# Patient Record
Sex: MALE | Race: WHITE | NOT HISPANIC OR LATINO
[De-identification: names, ages, dates, MRNs, and addresses within clinical notes are randomized per-mention and may not be internally consistent; named-entity substitution may affect disease eponyms.]

---

## 2017-10-20 PROBLEM — Z00.00 ENCOUNTER FOR PREVENTIVE HEALTH EXAMINATION: Status: ACTIVE | Noted: 2017-10-20

## 2017-11-01 ENCOUNTER — APPOINTMENT (OUTPATIENT)
Dept: INTERNAL MEDICINE | Facility: CLINIC | Age: 62
End: 2017-11-01
Payer: MEDICARE

## 2017-11-01 VITALS
OXYGEN SATURATION: 97 % | SYSTOLIC BLOOD PRESSURE: 120 MMHG | BODY MASS INDEX: 24.27 KG/M2 | DIASTOLIC BLOOD PRESSURE: 80 MMHG | RESPIRATION RATE: 16 BRPM | HEIGHT: 66 IN | WEIGHT: 151 LBS | TEMPERATURE: 98 F | HEART RATE: 79 BPM

## 2017-11-01 DIAGNOSIS — R05 COUGH: ICD-10-CM

## 2017-11-01 DIAGNOSIS — I65.22 OCCLUSION AND STENOSIS OF LEFT CAROTID ARTERY: ICD-10-CM

## 2017-11-01 DIAGNOSIS — Z82.49 FAMILY HISTORY OF ISCHEMIC HEART DISEASE AND OTHER DISEASES OF THE CIRCULATORY SYSTEM: ICD-10-CM

## 2017-11-01 DIAGNOSIS — Z77.090 CONTACT WITH AND (SUSPECTED) EXPOSURE TO ASBESTOS: ICD-10-CM

## 2017-11-01 DIAGNOSIS — H66.90 OTITIS MEDIA, UNSPECIFIED, UNSPECIFIED EAR: ICD-10-CM

## 2017-11-01 DIAGNOSIS — N40.0 BENIGN PROSTATIC HYPERPLASIA WITHOUT LOWER URINARY TRACT SYMPMS: ICD-10-CM

## 2017-11-01 DIAGNOSIS — R31.9 HEMATURIA, UNSPECIFIED: ICD-10-CM

## 2017-11-01 DIAGNOSIS — Z88.9 ALLERGY STATUS TO UNSPECIFIED DRUGS, MEDICAMENTS AND BIOLOGICAL SUBSTANCES: ICD-10-CM

## 2017-11-01 DIAGNOSIS — J30.9 ALLERGIC RHINITIS, UNSPECIFIED: ICD-10-CM

## 2017-11-01 DIAGNOSIS — D51.0 VITAMIN B12 DEFICIENCY ANEMIA DUE TO INTRINSIC FACTOR DEFICIENCY: ICD-10-CM

## 2017-11-01 DIAGNOSIS — R09.82 POSTNASAL DRIP: ICD-10-CM

## 2017-11-01 DIAGNOSIS — Z77.120 CONTACT WITH AND (SUSPECTED) EXPOSURE TO MOLD (TOXIC): ICD-10-CM

## 2017-11-01 DIAGNOSIS — J45.909 UNSPECIFIED ASTHMA, UNCOMPLICATED: ICD-10-CM

## 2017-11-01 DIAGNOSIS — Z78.9 OTHER SPECIFIED HEALTH STATUS: ICD-10-CM

## 2017-11-01 DIAGNOSIS — J04.10 ACUTE TRACHEITIS W/OUT OBSTRUCTION: ICD-10-CM

## 2017-11-01 DIAGNOSIS — R53.82 CHRONIC FATIGUE, UNSPECIFIED: ICD-10-CM

## 2017-11-01 DIAGNOSIS — R91.8 OTHER NONSPECIFIC ABNORMAL FINDING OF LUNG FIELD: ICD-10-CM

## 2017-11-01 DIAGNOSIS — L21.9 SEBORRHEIC DERMATITIS, UNSPECIFIED: ICD-10-CM

## 2017-11-01 PROCEDURE — 94060 EVALUATION OF WHEEZING: CPT

## 2017-11-01 PROCEDURE — 94729 DIFFUSING CAPACITY: CPT

## 2017-11-01 PROCEDURE — 94727 GAS DIL/WSHOT DETER LNG VOL: CPT

## 2017-11-01 PROCEDURE — 99205 OFFICE O/P NEW HI 60 MIN: CPT | Mod: 25

## 2017-11-01 RX ORDER — UBIQUINOL 100 MG
100 CAPSULE ORAL
Refills: 0 | Status: ACTIVE | COMMUNITY

## 2017-11-01 RX ORDER — KRILL/OM-3/DHA/EPA/PHOSPHO/AST 350MG-90MG
CAPSULE ORAL
Refills: 0 | Status: ACTIVE | COMMUNITY

## 2017-11-01 RX ORDER — MULTIVIT-MIN/FA/LYCOPEN/LUTEIN .4-300-25
TABLET ORAL
Refills: 0 | Status: ACTIVE | COMMUNITY

## 2017-11-01 RX ORDER — CYANOCOBALAMIN 1000 UG/ML
1000 INJECTION INTRAMUSCULAR; SUBCUTANEOUS
Refills: 0 | Status: ACTIVE | COMMUNITY

## 2017-11-01 RX ORDER — MULTIVIT-MIN/FOLIC/VIT K/LYCOP 400-300MCG
50 MCG TABLET ORAL
Refills: 0 | Status: ACTIVE | COMMUNITY

## 2017-11-01 RX ORDER — HYDROCORTISONE VALERATE 2 MG/G
0.2 CREAM TOPICAL
Refills: 0 | Status: ACTIVE | COMMUNITY

## 2017-11-01 RX ORDER — OMEGA-3/DHA/EPA/FISH OIL 300-1000MG
CAPSULE ORAL
Refills: 0 | Status: ACTIVE | COMMUNITY

## 2017-11-08 ENCOUNTER — OTHER (OUTPATIENT)
Age: 62
End: 2017-11-08

## 2017-11-17 DIAGNOSIS — E83.39 OTHER DISORDERS OF PHOSPHORUS METABOLISM: ICD-10-CM

## 2017-12-11 ENCOUNTER — APPOINTMENT (OUTPATIENT)
Dept: INTERNAL MEDICINE | Facility: CLINIC | Age: 62
End: 2017-12-11

## 2020-07-28 ENCOUNTER — APPOINTMENT (OUTPATIENT)
Dept: DERMATOLOGY | Facility: CLINIC | Age: 65
End: 2020-07-28
Payer: COMMERCIAL

## 2020-07-28 DIAGNOSIS — Z84.0 FAMILY HISTORY OF DISEASES OF THE SKIN AND SUBCUTANEOUS TISSUE: ICD-10-CM

## 2020-07-28 DIAGNOSIS — D22.5 MELANOCYTIC NEVI OF TRUNK: ICD-10-CM

## 2020-07-28 DIAGNOSIS — Z91.89 OTHER SPECIFIED PERSONAL RISK FACTORS, NOT ELSEWHERE CLASSIFIED: ICD-10-CM

## 2020-07-28 PROCEDURE — 99213 OFFICE O/P EST LOW 20 MIN: CPT

## 2020-07-28 NOTE — PHYSICAL EXAM
[Alert] : alert [Well Nourished] : well nourished [Oriented x 3] : ~L oriented x 3 [FreeTextEntry3] : The following areas were examined and no significant abnormalities were seen except as noted below:\par \par Type II skin\par \par scalp, face, eyelids, nose, lips, ears, neck, chest, abdomen, back,groin, buttocks, right arm, left arm, right hand, left hand,\par right  leg, left leg, right foot, left foot\par \par Scalp: Moderate thin pink scaly macules and small patches\par Face: Moderate ill-defined erythematous patches with a few papules, especially cheeks and around the mouth\par Back: Mild 3-5 mm tan papules\par \par No other suspicious lesions he

## 2020-07-28 NOTE — ASSESSMENT
[FreeTextEntry1] : Actinic keratoses on scalp\par Rosacea like dermatitis on face this?  Secondary to chronic usage of the hydrocortisone valerate cream 0.2%

## 2020-07-28 NOTE — HISTORY OF PRESENT ILLNESS
[FreeTextEntry1] : Evaluation of growths [de-identified] : Followup visit for 65-year-old white male presenting for evaluation of growths.  No history of skin cancer.\par Patient has history of seborrheic dermatitis on the scalp and face treated with hydrocortisone valerate cream 0.2% with good results.

## 2020-10-01 ENCOUNTER — APPOINTMENT (OUTPATIENT)
Dept: GASTROENTEROLOGY | Facility: CLINIC | Age: 65
End: 2020-10-01
Payer: COMMERCIAL

## 2020-10-01 VITALS
HEIGHT: 66 IN | BODY MASS INDEX: 24.11 KG/M2 | TEMPERATURE: 97 F | HEART RATE: 85 BPM | DIASTOLIC BLOOD PRESSURE: 98 MMHG | SYSTOLIC BLOOD PRESSURE: 145 MMHG | WEIGHT: 150 LBS

## 2020-10-01 DIAGNOSIS — R10.11 RIGHT UPPER QUADRANT PAIN: ICD-10-CM

## 2020-10-01 PROCEDURE — 99204 OFFICE O/P NEW MOD 45 MIN: CPT

## 2020-10-01 NOTE — PHYSICAL EXAM
[General Appearance - Alert] : alert [General Appearance - In No Acute Distress] : in no acute distress [Sclera] : the sclera and conjunctiva were normal [PERRL With Normal Accommodation] : pupils were equal in size, round, and reactive to light [Extraocular Movements] : extraocular movements were intact [Outer Ear] : the ears and nose were normal in appearance [Oropharynx] : the oropharynx was normal [Neck Appearance] : the appearance of the neck was normal [Neck Cervical Mass (___cm)] : no neck mass was observed [Jugular Venous Distention Increased] : there was no jugular-venous distention [Thyroid Diffuse Enlargement] : the thyroid was not enlarged [Thyroid Nodule] : there were no palpable thyroid nodules [Auscultation Breath Sounds / Voice Sounds] : lungs were clear to auscultation bilaterally [Heart Rate And Rhythm] : heart rate was normal and rhythm regular [Heart Sounds] : normal S1 and S2 [Heart Sounds Gallop] : no gallops [Murmurs] : no murmurs [Heart Sounds Pericardial Friction Rub] : no pericardial rub [Full Pulse] : the pedal pulses are present [Edema] : there was no peripheral edema [FreeTextEntry1] : Reports ED [Cervical Lymph Nodes Enlarged Posterior Bilaterally] : posterior cervical [Cervical Lymph Nodes Enlarged Anterior Bilaterally] : anterior cervical [Supraclavicular Lymph Nodes Enlarged Bilaterally] : supraclavicular [Axillary Lymph Nodes Enlarged Bilaterally] : axillary [Femoral Lymph Nodes Enlarged Bilaterally] : femoral [Inguinal Lymph Nodes Enlarged Bilaterally] : inguinal [No CVA Tenderness] : no ~M costovertebral angle tenderness [No Spinal Tenderness] : no spinal tenderness [Abnormal Walk] : normal gait [Nail Clubbing] : no clubbing  or cyanosis of the fingernails [Musculoskeletal - Swelling] : no joint swelling seen [Motor Tone] : muscle strength and tone were normal [Skin Color & Pigmentation] : normal skin color and pigmentation [Skin Turgor] : normal skin turgor [] : no rash [Deep Tendon Reflexes (DTR)] : deep tendon reflexes were 2+ and symmetric [Sensation] : the sensory exam was normal to light touch and pinprick [No Focal Deficits] : no focal deficits

## 2020-10-01 NOTE — ASSESSMENT
[FreeTextEntry1] : #1 right upper quadrant pain, versus right mid pain, rule out spigelian hernia versus mesenteric epiploica. We'll get an MRI of the abdomen and MRCP as well as blood work\par #2 schedule colonoscopy or screening

## 2020-10-12 ENCOUNTER — TRANSCRIPTION ENCOUNTER (OUTPATIENT)
Age: 65
End: 2020-10-12

## 2020-12-19 ENCOUNTER — NON-APPOINTMENT (OUTPATIENT)
Age: 65
End: 2020-12-19

## 2021-06-03 ENCOUNTER — NON-APPOINTMENT (OUTPATIENT)
Age: 66
End: 2021-06-03

## 2021-07-12 ENCOUNTER — APPOINTMENT (OUTPATIENT)
Dept: DERMATOLOGY | Facility: CLINIC | Age: 66
End: 2021-07-12

## 2021-08-18 ENCOUNTER — APPOINTMENT (OUTPATIENT)
Dept: DERMATOLOGY | Facility: CLINIC | Age: 66
End: 2021-08-18
Payer: COMMERCIAL

## 2021-08-18 DIAGNOSIS — L82.1 OTHER SEBORRHEIC KERATOSIS: ICD-10-CM

## 2021-08-18 DIAGNOSIS — L57.0 ACTINIC KERATOSIS: ICD-10-CM

## 2021-08-18 PROCEDURE — 99213 OFFICE O/P EST LOW 20 MIN: CPT

## 2021-08-18 NOTE — ASSESSMENT
[FreeTextEntry1] : Seborrheic dermatitis a mustache area and chin\par Rosacea on the cheeks and nose\par Actinic keratoses on scalp\par Incipient seborrheic keratosis versus verruca vulgaris on the volar forearm

## 2021-08-18 NOTE — HISTORY OF PRESENT ILLNESS
[FreeTextEntry1] : Evaluation of growths [de-identified] : Followup visit for this 66-year-old white male last seen by me on July 28, 2020, for evaluation of growths.  Particular concern about a lesion on the left volar forearm.\par \par No history of skin cancer.\par Patient has history of seborrheic dermatitis on the face which have been treated with hydrocortisone valerate cream 0.2%. At the last visit, patient was noted to have a rosacea like dermatitis.  Hydrocortisone valerate cream 0.2% was discontinued and patient started on metronidazole cream 0.75% b.i.d.

## 2021-08-18 NOTE — PHYSICAL EXAM
[Alert] : alert [Oriented x 3] : ~L oriented x 3 [Well Nourished] : well nourished [FreeTextEntry3] : Scalp: Rare pink scaly macules, especially in the mid crown\par Face: Mild to moderate pink papules present on the cheeks and nose\par Ill-defined erythema and scaling present in the mustache area and chin\par Left volar forearm:  655 mm thin pink slightly verrucous plaque\par \par No other suspicious lesions seen

## 2022-04-25 RX ORDER — SODIUM SULFATE, POTASSIUM SULFATE, MAGNESIUM SULFATE 17.5; 3.13; 1.6 G/ML; G/ML; G/ML
17.5-3.13-1.6 SOLUTION, CONCENTRATE ORAL
Qty: 1 | Refills: 0 | Status: ACTIVE | COMMUNITY
Start: 2020-10-01 | End: 1900-01-01

## 2022-05-16 ENCOUNTER — APPOINTMENT (OUTPATIENT)
Dept: GASTROENTEROLOGY | Facility: AMBULATORY MEDICAL SERVICES | Age: 67
End: 2022-05-16
Payer: COMMERCIAL

## 2022-05-16 ENCOUNTER — RESULT REVIEW (OUTPATIENT)
Age: 67
End: 2022-05-16

## 2022-05-16 PROCEDURE — 45380 COLONOSCOPY AND BIOPSY: CPT

## 2022-09-21 ENCOUNTER — RX RENEWAL (OUTPATIENT)
Age: 67
End: 2022-09-21

## 2022-10-24 ENCOUNTER — APPOINTMENT (OUTPATIENT)
Dept: DERMATOLOGY | Facility: CLINIC | Age: 67
End: 2022-10-24

## 2022-10-24 DIAGNOSIS — L84 CORNS AND CALLOSITIES: ICD-10-CM

## 2022-10-24 DIAGNOSIS — L71.8 OTHER ROSACEA: ICD-10-CM

## 2022-10-24 DIAGNOSIS — L21.8 OTHER SEBORRHEIC DERMATITIS: ICD-10-CM

## 2022-10-24 PROCEDURE — 99213 OFFICE O/P EST LOW 20 MIN: CPT

## 2022-10-24 NOTE — PHYSICAL EXAM
[Alert] : alert [Oriented x 3] : ~L oriented x 3 [Well Nourished] : well nourished [FreeTextEntry3] : The following areas were examined and no significant abnormalities were seen except as noted below:\par \par Type II skin\par \par scalp, face, eyelids, nose, lips, ears, neck, chest, abdomen, back,groin, buttocks, right arm, left arm, right hand, left hand,\par right  leg, left leg, right foot, left foot\par \par Face: Mild symmetric erythema present on the inner cheeks and chin\par Rare papules present on left lateral chin and right upper inner cheek\par Right distal lateral sole: 4 x 4 mm depressed firm brown papule\par \par No suspicious lesions seen\par

## 2022-10-24 NOTE — HISTORY OF PRESENT ILLNESS
[FreeTextEntry1] : Evaluation of growths [de-identified] : Follow-up visit for 67-year-old white male last seen by me on August 18, 2021, for evaluation of growths.  Particularly concerned about a lesion on the right sole\par No history of skin cancer.\par \par Patient also has a history of a rash on the face.  Initially diagnosed as seborrheic dermatitis and treated with hydrocortisone valerate cream 0.2% with good control.  Patient was subsequently diagnosed with a rosacea-like dermatitis.  This was treated with metronidazole cream 0.75% twice daily.\par \par Patient states the metronidazole cream "stopped working" in January, 2022.  Using the hydrocortisone valerate cream 0.2% once every other day with adequate control.

## 2022-10-24 NOTE — ASSESSMENT
[FreeTextEntry1] : Seborrheic dermatitis on face\par Rosacea on face–both under adequate control\par Probable callus on right distal sole

## 2023-01-25 ENCOUNTER — OFFICE (OUTPATIENT)
Dept: URBAN - METROPOLITAN AREA CLINIC 12 | Facility: CLINIC | Age: 68
Setting detail: OPHTHALMOLOGY
End: 2023-01-25
Payer: COMMERCIAL

## 2023-01-25 DIAGNOSIS — H25.13: ICD-10-CM

## 2023-01-25 DIAGNOSIS — H43.393: ICD-10-CM

## 2023-01-25 PROCEDURE — 99204 OFFICE O/P NEW MOD 45 MIN: CPT | Performed by: OPHTHALMOLOGY

## 2023-01-25 ASSESSMENT — REFRACTION_CURRENTRX
OS_CYLINDER: -3.00
OD_CYLINDER: -3.25
OD_ADD: +2.75
OS_SPHERE: -2.75
OS_ADD: +2.75
OS_OVR_VA: 20/
OD_OVR_VA: 20/
OD_SPHERE: +0.50
OS_VPRISM_DIRECTION: PROGS
OS_AXIS: 039
OD_VPRISM_DIRECTION: PROGS
OD_AXIS: 142

## 2023-01-25 ASSESSMENT — SPHEQUIV_DERIVED
OD_SPHEQUIV: -0.5
OS_SPHEQUIV: -4.625
OS_SPHEQUIV: -4.625
OD_SPHEQUIV: -0.5

## 2023-01-25 ASSESSMENT — AXIALLENGTH_DERIVED
OS_AL: 25.4891
OD_AL: 23.6031
OD_AL: 23.6031
OS_AL: 25.4891

## 2023-01-25 ASSESSMENT — REFRACTION_MANIFEST
OD_AXIS: 130
OD_CYLINDER: -3.00
OS_SPHERE: -3.00
OS_CYLINDER: -3.25
OD_VA1: 20/20
OD_SPHERE: +1.00
OS_AXIS: 050
OS_VA1: 20/30

## 2023-01-25 ASSESSMENT — KERATOMETRY
OS_K1POWER_DIOPTERS: 42.25
OD_K1POWER_DIOPTERS: 42.75
OD_K2POWER_DIOPTERS: 45.25
OS_AXISANGLE_DEGREES: 122
OD_AXISANGLE_DEGREES: 059
OS_K2POWER_DIOPTERS: 45.00

## 2023-01-25 ASSESSMENT — CONFRONTATIONAL VISUAL FIELD TEST (CVF)
OS_FINDINGS: FULL
OD_FINDINGS: FULL

## 2023-01-25 ASSESSMENT — REFRACTION_AUTOREFRACTION
OD_CYLINDER: -3.00
OD_AXIS: 132
OS_CYLINDER: -3.25
OS_AXIS: 050
OD_SPHERE: +1.00
OS_SPHERE: -3.00

## 2023-01-25 ASSESSMENT — TONOMETRY
OS_IOP_MMHG: 19
OD_IOP_MMHG: 18

## 2023-01-25 ASSESSMENT — VISUAL ACUITY
OS_BCVA: 20/20
OD_BCVA: 20/50-1

## 2023-04-06 ENCOUNTER — NON-APPOINTMENT (OUTPATIENT)
Age: 68
End: 2023-04-06

## 2023-05-15 ENCOUNTER — OFFICE (OUTPATIENT)
Dept: URBAN - METROPOLITAN AREA CLINIC 12 | Facility: CLINIC | Age: 68
Setting detail: OPHTHALMOLOGY
End: 2023-05-15
Payer: COMMERCIAL

## 2023-05-15 DIAGNOSIS — H25.13: ICD-10-CM

## 2023-05-15 DIAGNOSIS — H25.12: ICD-10-CM

## 2023-05-15 PROCEDURE — 99213 OFFICE O/P EST LOW 20 MIN: CPT | Performed by: OPHTHALMOLOGY

## 2023-05-15 PROCEDURE — 92136 OPHTHALMIC BIOMETRY: CPT | Performed by: OPHTHALMOLOGY

## 2023-05-15 ASSESSMENT — KERATOMETRY
OS_AXISANGLE_DEGREES: 126
OD_K2POWER_DIOPTERS: 45.25
OS_K2POWER_DIOPTERS: 44.75
OD_AXISANGLE_DEGREES: 58
OD_K1POWER_DIOPTERS: 42.50
OS_K1POWER_DIOPTERS: 42.50

## 2023-05-15 ASSESSMENT — AXIALLENGTH_DERIVED
OD_AL: 23.6491
OS_AL: 25.4891
OS_AL: 25.6033
OD_AL: 23.7474

## 2023-05-15 ASSESSMENT — REFRACTION_CURRENTRX
OD_ADD: +2.75
OS_ADD: +2.75
OD_OVR_VA: 20/
OS_AXIS: 37
OS_CYLINDER: -3.00
OS_VPRISM_DIRECTION: PROGS
OD_AXIS: 139
OS_SPHERE: -2.75
OS_OVR_VA: 20/
OD_SPHERE: +0.50
OD_CYLINDER: -3.00
OD_VPRISM_DIRECTION: PROGS

## 2023-05-15 ASSESSMENT — REFRACTION_MANIFEST
OS_VA1: 20/30
OS_SPHERE: -3.00
OS_CYLINDER: -3.25
OD_AXIS: 130
OD_SPHERE: +1.00
OD_CYLINDER: -3.00
OD_VA1: 20/20
OS_AXIS: 050

## 2023-05-15 ASSESSMENT — REFRACTION_AUTOREFRACTION
OS_CYLINDER: -3.25
OD_SPHERE: +0.75
OD_AXIS: 138
OD_CYLINDER: -3.00
OS_AXIS: 46
OS_SPHERE: -3.25

## 2023-05-15 ASSESSMENT — SPHEQUIV_DERIVED
OS_SPHEQUIV: -4.875
OS_SPHEQUIV: -4.625
OD_SPHEQUIV: -0.5
OD_SPHEQUIV: -0.75

## 2023-05-15 ASSESSMENT — TONOMETRY
OD_IOP_MMHG: 14
OS_IOP_MMHG: 15

## 2023-05-15 ASSESSMENT — CONFRONTATIONAL VISUAL FIELD TEST (CVF)
OD_FINDINGS: FULL
OS_FINDINGS: FULL

## 2023-05-15 ASSESSMENT — VISUAL ACUITY
OS_BCVA: 20/20-1
OD_BCVA: 20/60-1

## 2023-05-30 ENCOUNTER — ASC (OUTPATIENT)
Dept: URBAN - METROPOLITAN AREA SURGERY 8 | Facility: SURGERY | Age: 68
Setting detail: OPHTHALMOLOGY
End: 2023-05-30
Payer: COMMERCIAL

## 2023-05-30 DIAGNOSIS — H52.212: ICD-10-CM

## 2023-05-30 DIAGNOSIS — H25.12: ICD-10-CM

## 2023-05-30 PROCEDURE — 66984 XCAPSL CTRC RMVL W/O ECP: CPT | Performed by: OPHTHALMOLOGY

## 2023-05-30 PROCEDURE — FEMTO PRECISION LASER CATARACT SURGERY: Performed by: OPHTHALMOLOGY

## 2023-05-30 PROCEDURE — V2787 ASTIGMATISM-CORRECT FUNCTION: HCPCS | Performed by: OPHTHALMOLOGY

## 2023-05-31 ENCOUNTER — OFFICE (OUTPATIENT)
Dept: URBAN - METROPOLITAN AREA CLINIC 12 | Facility: CLINIC | Age: 68
Setting detail: OPHTHALMOLOGY
End: 2023-05-31
Payer: COMMERCIAL

## 2023-05-31 DIAGNOSIS — H25.11: ICD-10-CM

## 2023-05-31 PROBLEM — Z96.1 PSEUDOPHAKIA-1 DAY P/O CAT W/ IOL: Status: ACTIVE | Noted: 2023-05-31

## 2023-05-31 PROCEDURE — 92136 OPHTHALMIC BIOMETRY: CPT | Performed by: OPHTHALMOLOGY

## 2023-05-31 ASSESSMENT — SPHEQUIV_DERIVED
OD_SPHEQUIV: -0.5
OS_SPHEQUIV: -4.625
OD_SPHEQUIV: -0.625

## 2023-05-31 ASSESSMENT — REFRACTION_MANIFEST
OD_CYLINDER: -3.00
OS_SPHERE: -3.00
OD_AXIS: 130
OS_AXIS: 050
OS_CYLINDER: -3.25
OD_VA1: 20/20
OD_SPHERE: +1.00
OS_VA1: 20/30

## 2023-05-31 ASSESSMENT — REFRACTION_AUTOREFRACTION
OD_SPHERE: +0.75
OS_AXIS: 080
OD_CYLINDER: -2.75
OD_AXIS: 135
OS_CYLINDER: -0.75
OS_SPHERE: PLANO

## 2023-05-31 ASSESSMENT — TONOMETRY
OD_IOP_MMHG: 14
OS_IOP_MMHG: 17

## 2023-05-31 ASSESSMENT — REFRACTION_CURRENTRX
OD_VPRISM_DIRECTION: PROGS
OD_ADD: +2.75
OS_ADD: +2.75
OS_OVR_VA: 20/
OS_AXIS: 37
OS_CYLINDER: -3.00
OS_VPRISM_DIRECTION: PROGS
OD_OVR_VA: 20/
OS_SPHERE: -2.75
OD_CYLINDER: -3.00
OD_AXIS: 139
OD_SPHERE: +0.50

## 2023-05-31 ASSESSMENT — CONFRONTATIONAL VISUAL FIELD TEST (CVF)
OD_FINDINGS: FULL
OS_FINDINGS: FULL

## 2023-05-31 ASSESSMENT — VISUAL ACUITY
OD_BCVA: 20/30-2
OS_BCVA: 20/20

## 2023-06-13 ENCOUNTER — ASC (OUTPATIENT)
Dept: URBAN - METROPOLITAN AREA SURGERY 8 | Facility: SURGERY | Age: 68
Setting detail: OPHTHALMOLOGY
End: 2023-06-13
Payer: COMMERCIAL

## 2023-06-13 DIAGNOSIS — H52.211: ICD-10-CM

## 2023-06-13 DIAGNOSIS — H25.11: ICD-10-CM

## 2023-06-13 PROCEDURE — FEMTO FEMTOSECOND LASER: Performed by: OPHTHALMOLOGY

## 2023-06-13 PROCEDURE — V2787 ASTIGMATISM-CORRECT FUNCTION: HCPCS | Performed by: OPHTHALMOLOGY

## 2023-06-13 PROCEDURE — 66984 XCAPSL CTRC RMVL W/O ECP: CPT | Performed by: OPHTHALMOLOGY

## 2023-06-14 ENCOUNTER — RX ONLY (RX ONLY)
Age: 68
End: 2023-06-14

## 2023-06-14 ENCOUNTER — OFFICE (OUTPATIENT)
Dept: URBAN - METROPOLITAN AREA CLINIC 12 | Facility: CLINIC | Age: 68
Setting detail: OPHTHALMOLOGY
End: 2023-06-14
Payer: COMMERCIAL

## 2023-06-14 DIAGNOSIS — Z96.1: ICD-10-CM

## 2023-06-14 PROCEDURE — 99024 POSTOP FOLLOW-UP VISIT: CPT | Performed by: OPTOMETRIST

## 2023-06-14 ASSESSMENT — REFRACTION_MANIFEST
OD_VA1: 20/20
OS_SPHERE: -3.00
OD_SPHERE: +1.00
OD_AXIS: 130
OS_CYLINDER: -3.25
OS_AXIS: 050
OS_VA1: 20/30
OD_CYLINDER: -3.00

## 2023-06-14 ASSESSMENT — REFRACTION_CURRENTRX
OD_CYLINDER: -3.00
OS_AXIS: 37
OD_VPRISM_DIRECTION: PROGS
OS_SPHERE: -2.75
OD_OVR_VA: 20/
OD_ADD: +2.75
OD_SPHERE: +0.50
OS_ADD: +2.75
OS_OVR_VA: 20/
OS_VPRISM_DIRECTION: PROGS
OD_AXIS: 139
OS_CYLINDER: -3.00

## 2023-06-14 ASSESSMENT — KERATOMETRY
METHOD_AUTO_MANUAL: AUTO
OS_K2POWER_DIOPTERS: 44.75
OD_K2POWER_DIOPTERS: 45.00
OS_K1POWER_DIOPTERS: 42.50
OD_K1POWER_DIOPTERS: 42.25
OD_AXISANGLE_DEGREES: 058
OS_AXISANGLE_DEGREES: 124

## 2023-06-14 ASSESSMENT — REFRACTION_AUTOREFRACTION
OD_SPHERE: PLANO
OS_CYLINDER: -0.75
OS_AXIS: 076
OD_CYLINDER: -0.25
OS_SPHERE: PLANO
OD_AXIS: 124

## 2023-06-14 ASSESSMENT — AXIALLENGTH_DERIVED
OS_AL: 25.4891
OD_AL: 23.7417

## 2023-06-14 ASSESSMENT — VISUAL ACUITY
OD_BCVA: 20/25-1
OS_BCVA: 20/20-1

## 2023-06-14 ASSESSMENT — TONOMETRY
OD_IOP_MMHG: 17
OS_IOP_MMHG: 16

## 2023-06-14 ASSESSMENT — SPHEQUIV_DERIVED
OD_SPHEQUIV: -0.5
OS_SPHEQUIV: -4.625

## 2023-06-14 ASSESSMENT — CONFRONTATIONAL VISUAL FIELD TEST (CVF)
OD_FINDINGS: FULL
OS_FINDINGS: FULL

## 2023-10-12 RX ORDER — HYDROCORTISONE VALERATE 2 MG/G
0.2 CREAM TOPICAL
Qty: 60 | Refills: 1 | Status: ACTIVE | COMMUNITY
Start: 2022-09-21 | End: 1900-01-01

## 2023-12-20 ENCOUNTER — OFFICE (OUTPATIENT)
Dept: URBAN - METROPOLITAN AREA CLINIC 12 | Facility: CLINIC | Age: 68
Setting detail: OPHTHALMOLOGY
End: 2023-12-20
Payer: COMMERCIAL

## 2023-12-20 DIAGNOSIS — H43.393: ICD-10-CM

## 2023-12-20 DIAGNOSIS — Z96.1: ICD-10-CM

## 2023-12-20 DIAGNOSIS — H16.223: ICD-10-CM
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
## 2023-12-20 DIAGNOSIS — H10.503: ICD-10-CM

## 2023-12-20 PROCEDURE — BRUDER EYE BRUDER EYE PADS: Performed by: OPHTHALMOLOGY

## 2023-12-20 PROCEDURE — 92014 COMPRE OPH EXAM EST PT 1/>: CPT | Performed by: OPHTHALMOLOGY

## 2023-12-20 ASSESSMENT — REFRACTION_CURRENTRX
OS_AXIS: 37
OS_VPRISM_DIRECTION: PROGS
OS_OVR_VA: 20/
OS_CYLINDER: -3.00
OS_SPHERE: -2.75
OD_CYLINDER: -3.00
OD_ADD: +2.75
OD_SPHERE: +0.50
OD_AXIS: 139
OD_OVR_VA: 20/
OS_ADD: +2.75
OD_VPRISM_DIRECTION: PROGS

## 2023-12-20 ASSESSMENT — CONFRONTATIONAL VISUAL FIELD TEST (CVF)
OD_FINDINGS: FULL
OS_FINDINGS: FULL

## 2023-12-20 ASSESSMENT — SUPERFICIAL PUNCTATE KERATITIS (SPK)
OS_SPK: 1+
OD_SPK: 1+

## 2023-12-20 ASSESSMENT — REFRACTION_MANIFEST
OD_CYLINDER: -0.25
OD_AXIS: 177
OD_VA1: 20/20-
OS_CYLINDER: -1.00
OD_SPHERE: PLANO
OS_AXIS: 077
OS_VA1: 20/20
OS_SPHERE: +0.25

## 2023-12-20 ASSESSMENT — REFRACTION_AUTOREFRACTION
OS_AXIS: 077
OS_SPHERE: +0.25
OD_CYLINDER: -0.25
OS_CYLINDER: -1.00
OD_AXIS: 177
OD_SPHERE: PLANO

## 2023-12-20 ASSESSMENT — LID EXAM ASSESSMENTS
OD_BLEPHARITIS: 1+
OS_BLEPHARITIS: 1+

## 2023-12-20 ASSESSMENT — SPHEQUIV_DERIVED
OS_SPHEQUIV: -0.25
OS_SPHEQUIV: -0.25

## 2024-01-25 ENCOUNTER — OFFICE (OUTPATIENT)
Dept: URBAN - METROPOLITAN AREA CLINIC 12 | Facility: CLINIC | Age: 69
Setting detail: OPHTHALMOLOGY
End: 2024-01-25
Payer: COMMERCIAL

## 2024-01-25 DIAGNOSIS — Z96.1: ICD-10-CM

## 2024-01-25 DIAGNOSIS — H10.503: ICD-10-CM

## 2024-01-25 DIAGNOSIS — H43.393: ICD-10-CM

## 2024-01-25 DIAGNOSIS — H16.223: ICD-10-CM

## 2024-01-25 PROCEDURE — 99213 OFFICE O/P EST LOW 20 MIN: CPT | Performed by: OPHTHALMOLOGY

## 2024-01-25 ASSESSMENT — REFRACTION_CURRENTRX
OS_AXIS: 37
OD_ADD: +2.75
OD_SPHERE: +0.50
OD_CYLINDER: -3.00
OS_ADD: +2.75
OS_VPRISM_DIRECTION: PROGS
OS_OVR_VA: 20/
OD_OVR_VA: 20/
OS_CYLINDER: -3.00
OD_AXIS: 139
OD_VPRISM_DIRECTION: PROGS
OS_SPHERE: -2.75

## 2024-01-25 ASSESSMENT — REFRACTION_AUTOREFRACTION
OS_AXIS: 082
OD_SPHERE: -0.25
OS_SPHERE: +0.25
OD_CYLINDER: -0.25
OS_CYLINDER: -1.00
OD_AXIS: 161

## 2024-01-25 ASSESSMENT — REFRACTION_MANIFEST
OS_AXIS: 077
OD_AXIS: 177
OS_CYLINDER: -1.00
OD_SPHERE: PLANO
OD_VA1: 20/20-
OS_VA1: 20/20
OD_CYLINDER: -0.25
OS_SPHERE: +0.25

## 2024-01-25 ASSESSMENT — CONFRONTATIONAL VISUAL FIELD TEST (CVF)
OS_FINDINGS: FULL
OD_FINDINGS: FULL

## 2024-01-25 ASSESSMENT — SUPERFICIAL PUNCTATE KERATITIS (SPK)
OD_SPK: 1+
OS_SPK: 1+

## 2024-01-25 ASSESSMENT — LID EXAM ASSESSMENTS
OD_BLEPHARITIS: 1+
OS_BLEPHARITIS: 1+

## 2024-01-25 ASSESSMENT — SPHEQUIV_DERIVED
OS_SPHEQUIV: -0.25
OS_SPHEQUIV: -0.25
OD_SPHEQUIV: -0.375

## 2024-02-05 ENCOUNTER — OFFICE (OUTPATIENT)
Dept: URBAN - METROPOLITAN AREA CLINIC 12 | Facility: CLINIC | Age: 69
Setting detail: OPHTHALMOLOGY
End: 2024-02-05
Payer: COMMERCIAL

## 2024-02-05 DIAGNOSIS — H16.221: ICD-10-CM

## 2024-02-05 DIAGNOSIS — H16.223: ICD-10-CM

## 2024-02-05 DIAGNOSIS — H16.222: ICD-10-CM

## 2024-02-05 PROCEDURE — 83861 MICROFLUID ANALY TEARS: CPT | Mod: LT | Performed by: OPHTHALMOLOGY

## 2024-02-05 PROCEDURE — 83861 MICROFLUID ANALY TEARS: CPT | Mod: RT | Performed by: OPHTHALMOLOGY

## 2024-02-05 PROCEDURE — 99213 OFFICE O/P EST LOW 20 MIN: CPT | Performed by: OPHTHALMOLOGY

## 2024-02-05 ASSESSMENT — REFRACTION_CURRENTRX
OS_SPHERE: -2.75
OD_CYLINDER: -3.00
OS_AXIS: 37
OS_OVR_VA: 20/
OS_CYLINDER: -3.00
OD_AXIS: 139
OD_OVR_VA: 20/
OD_ADD: +2.75
OD_VPRISM_DIRECTION: PROGS
OS_VPRISM_DIRECTION: PROGS
OS_ADD: +2.75
OD_SPHERE: +0.50

## 2024-02-05 ASSESSMENT — REFRACTION_AUTOREFRACTION
OS_AXIS: 084
OS_SPHERE: PLANO
OD_SPHERE: PLANO
OD_CYLINDER: -0.25
OD_AXIS: 149
OS_CYLINDER: -0.75

## 2024-02-05 ASSESSMENT — REFRACTION_MANIFEST
OS_AXIS: 084
OD_VA1: 20/25+2
OD_AXIS: 149
OD_SPHERE: PLANO
OD_CYLINDER: -0.25
OS_VA1: 20/20
OS_SPHERE: PLANO
OS_CYLINDER: -0.75

## 2024-02-05 ASSESSMENT — CONFRONTATIONAL VISUAL FIELD TEST (CVF)
OD_FINDINGS: FULL
OS_FINDINGS: FULL

## 2024-02-05 ASSESSMENT — SUPERFICIAL PUNCTATE KERATITIS (SPK)
OD_SPK: 1+
OS_SPK: 1+

## 2024-02-05 ASSESSMENT — LID EXAM ASSESSMENTS
OD_BLEPHARITIS: 1+
OS_BLEPHARITIS: 1+

## 2024-05-30 RX ORDER — METRONIDAZOLE 7.5 MG/G
0.75 CREAM TOPICAL
Qty: 45 | Refills: 2 | Status: ACTIVE | COMMUNITY
Start: 2020-07-28 | End: 1900-01-01